# Patient Record
Sex: MALE | Race: WHITE
[De-identification: names, ages, dates, MRNs, and addresses within clinical notes are randomized per-mention and may not be internally consistent; named-entity substitution may affect disease eponyms.]

---

## 2017-07-19 ENCOUNTER — HOSPITAL ENCOUNTER (OUTPATIENT)
Dept: HOSPITAL 45 - C.CTS | Age: 22
Discharge: HOME | End: 2017-07-19
Attending: PHYSICIAN ASSISTANT
Payer: COMMERCIAL

## 2017-07-19 DIAGNOSIS — R10.31: Primary | ICD-10-CM

## 2017-07-19 NOTE — DIAGNOSTIC IMAGING REPORT
ABD/PELVIS IV AND ORAL CONT



CLINICAL HISTORY: 21 years-old Male presenting with right lower quadrant pain

for 2 days. 



TECHNIQUE: Multidetector CT of the abdomen and pelvis was performed after the

administration of oral and intravenous contrast. IV contrast: 93 mL of Omniray

320. A dose lowering technique was used consistent with the principles of ALARA

(as low as reasonably achievable). 



COMPARISON: None.



CT DOSE: The estimated cumulative dose is 304.97 mGy.cm.



FINDINGS:



 topogram: Unremarkable.



Lung bases: Lung bases clear. No pericardial or pleural effusion.



Liver: Normal morphology. No liver lesion. Patent hepatic vasculature.



Biliary: No intrahepatic or extrahepatic biliary ductal dilatation. Normal

gallbladder.



Pancreas: Normal.



Spleen: Normal.



Adrenal glands: Normal.



Kidneys and ureters: Normal. No hydronephrosis.



Gastrointestinal tract: Oral contrast has transited to the descending colon. The

appendix is mildly dilated although opacifies normally with oral contrast. No

periappendiceal inflammatory change. No bowel obstruction.



Peritoneal cavity: No free fluid or intraperitoneal gas.



Bladder: Normal.



Pelvic organs: Normal.



Vasculature: Aorta and IVC patent and normal in caliber.



Lymph nodes: No enlarged lymph nodes in the abdomen or pelvis.



Abdominal wall: Normal.



Musculoskeletal: Normal.



IMPRESSION:

1.  No acute intra-abdominal pathology. No evidence of appendicitis. No CT

explanation for the patient's symptomatology.











Electronically signed by:  Marco Antonio Proctor M.D.

7/19/2017 1:02 PM



Dictated Date/Time:  7/19/2017 12:55 PM

## 2018-05-17 ENCOUNTER — HOSPITAL ENCOUNTER (EMERGENCY)
Dept: HOSPITAL 45 - C.EDB | Age: 23
Discharge: HOME | End: 2018-05-17
Payer: COMMERCIAL

## 2018-05-17 VITALS — SYSTOLIC BLOOD PRESSURE: 116 MMHG | DIASTOLIC BLOOD PRESSURE: 54 MMHG | HEART RATE: 69 BPM | OXYGEN SATURATION: 98 %

## 2018-05-17 VITALS
WEIGHT: 179.68 LBS | WEIGHT: 179.68 LBS | HEIGHT: 72.99 IN | HEIGHT: 72.99 IN | BODY MASS INDEX: 23.81 KG/M2 | BODY MASS INDEX: 23.81 KG/M2

## 2018-05-17 VITALS — TEMPERATURE: 97.52 F

## 2018-05-17 DIAGNOSIS — Z79.1: ICD-10-CM

## 2018-05-17 DIAGNOSIS — J45.909: ICD-10-CM

## 2018-05-17 DIAGNOSIS — J30.2: Primary | ICD-10-CM

## 2018-05-17 DIAGNOSIS — K20.0: ICD-10-CM

## 2018-05-17 DIAGNOSIS — F12.90: ICD-10-CM

## 2018-05-17 NOTE — EMERGENCY ROOM VISIT NOTE
History


First contact with patient:  02:45


Chief Complaint:  THROAT PAIN/INJURY


Stated Complaint:  CONSTRICTED THROAT





History of Present Illness


The patient is a 22 year old male who presents to the Emergency Room with 

complaints of coughing episode tonight we tried as well as Allegra-D medicine 

that went down the wrong windpipe.  Patient states he tried to cough it up and 

his monitor to give him the Heimlich but this does not help.  EMS arrived and 

put the laryngoscope to his throat and the pill popped out.  Patient states he 

has problems with allergies and is on multiple medicines.  He has osteophytic 

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  He was scoped last year was diagnosed with 

this and had strictures that were dilated.  He was on omeprazole but now has 

stopped it.  Patient states she has been suffering with allergies and asthma 

since she has been in [place].  He is from [facility] and goes back 

next month.  He states he tried multiple medications.  Currently right now he 

restarted his prednisone and Singulair, Allegra and Afrin.  Patient states he 

was standing order in the pharmacy for prednisone and Singulair from [facility].  Patient states he feels like his asthma is okay.  He was taking his 

extra medicines for his allergies.  Patient also states sometimes he has 

problems swallowing food down.  He states last time he got this bad was when he 

saw the GI doctor for his EGD.  He states he had great improvement after being 

dilated and on the omeprazole.  Patient states right now he is symptom-free and 

feels much better.  Patient denies chest pain, dyspnea, fever, chills, cough,  

dysphagia.





Review of Systems


An 10 system review of systems was completed with positives and pertinent 

negatives listed in the HPI.





Past Medical/Surgical History


Eosinophilic esophagitis, seasonal allergies, asthma, migraine





Family History





Stroke





Social History


Smoking Status:  Never Smoker


Alcohol Use:  occasionally


Drug Use:  marijuana


Marital Status:  in relationship


Occupation Status:  [employer] student





Current/Historical Medications


Scheduled


Cetirizine Hcl (Zyrtec Allergy), 10 MG PO DAILY


Cetirizine-Pseudoephedrine (All Day Allergy D), 1 TAB PO BID


Montelukast Sodium (Singulair), 10 MG PO QAM


Montelukast Sodium (Singulair), 1 TAB PO DAILY


Omeprazole (Prilosec), 20 MG PO BID


Omeprazole (Prilosec), 40 MG PO DAILY





Scheduled PRN


Fluticasone Propionate (Nasal) (Flonase Allergy Relief), 2 SPRAYS YORDY DAILY PRN 

for PRN


Lorazepam (Ativan), 1 MG PO DAILY PRN for Anxiety


Sumatriptan Succinate (Imitrex), 50 MG PO PRN PRN for Migraine





Physical Exam


Vital Signs











  Date Time  Temp Pulse Resp B/P (MAP) Pulse Ox O2 Delivery O2 Flow Rate FiO2


 


18 02:41 36.4 67 18 108/61 99 Room Air  











Physical Exam


VITALS: Vitals are noted on the nurse's note and reviewed by myself.  Vital 

signs stable.


GENERAL: Pleasant male maintaining his own secretions and speaking in full 

sentences, in no acute distress, nondiaphoretic, well-developed well-nourished.


SKIN: The skin was without rashes, erythema, edema, or bruising.  There is no 

tenting of the skin.  Capillary reflex less than 2 seconds.


HEAD: Normocephalic atraumatic.  


EARS: External auditory canals clear, tympanic membranes pearly gray without 

erythema or effusion bilaterally.


EYES: Pupils equal round and reactive to light and accommodation.  Conjunctivae 

without injection, sclerae without icterus.  Extraocular movements intact.  


NOSE: Patent, turbinates without inflammation or discharge.  No sinus 

tenderness.


MOUTH: Mucous membranes moist.   Pharynx without erythema or exudate.  Uvula 

midline.  Airway patent.  Tongue does not deviate.  


NECK: Supple without nuchal rigidity.  No lymphadenopathy.  No thyromegaly.  

Cervical spine is nontender.  No JVD.


HEART: Regular rate and rhythm without murmurs gallops or rubs.


LUNGS: Clear to auscultation bilaterally without wheezes, rales or rhonchi.  No 

retractions or accessory muscle use.


ABDOMEN: Positive bowel sounds x 4.  Normal tympanic percussion.  Soft, 

nontender, without masses or organomegaly.  Barrientos sign negative.  No guarding 

or rebound tenderness. 


MUSCULOSKELETAL: No muscle atrophy, erythema, or edema noted.  


NEURO: Patient was alert and oriented to person place and time.  Normal 

sensation to light and sharp touch.     No focal neurological deficits.





Medical Decision & Procedures


ED Course


Prior records/ancillary studies reviewed.


Triage Nursing notes reviewed.


Additional history obtained from the family.





The patient's history was concerning for foreign body in the airway that was 

resolved prior to arrival by EMS.





Differential diagnosis:


Etiologies such as infections, reactive airway disease, aspiration pneumonia, 

pneumothorax, seasonal allergies, reflux, esophagitis, as well as others were 

entertained.





Physical examination:


As above. 





ER treatment provided:


Omeprazole, Singulair, Zyrtec


On reassessment the patient felt better.





Diagnostic interpretation by me:


Deferred





This appears to be consistent with seasonal allergies with his known 

eosinophilic esophagitis.  Patient's been having increasing problems swallowing 

solids.  He is having progressively worsening signs and symptoms of his 

allergies.  He was advised to follow-up with his GI doctor for repeat EGD if 

warranted and he was advised to restart his omeprazole.  He was given a 

prescription for this.  He already has had allergy testing by the allergist 

last year.  He is advised to avoid all his allergies.  Patient was advised to 

take Zyrtec, Singulair, Afrin and Sudafed as directed.  Patient was advised to 

take all medicines 1 tablet at time standing up with a full glass of water.  

Patient was advised to follow-up health services and GI this week or here in 

the ER sooner for chest pain, difficulty swallowing, worsening sinus symptoms 

or as needed.  Patient was maintaining his own secretions.  He is speaking in 

full sentences.  He was well-appearing.  All questions are answered.  By the 

evaluation outlined above emergent etiologies such as aspiration pneumonia, 

pneumothorax,  serious bacterial infections, as well as others were deemed 

relatively unlikely.  Case management was contacted to help facilitate follow-

up appointment with GI.





The pt informed about the findings as listed above. All questions were answered 

and  pleased with the treatment. Return instructions were outlined and the 

patient was discharged in stable condition.





Outpatient prescription management:


Omeprazole, Zyrtec, Singulair





Referral:


The patient was referred back to their primary care physician and GI for follow-

up in 2 to 3 days for a recheck of the current condition.





The chart was completed utilizing Dragon Speech voice recognition software.   

Grammatical errors, random word insertions, pronoun errors, and incomplete 

sentences are an occassional consequence of this system due to software 

limitations, ambient noise, and hardware issues.  Any formal questions or 

concerns about the content, text, or information contained within the body of 

this dictation should be directly addressed to the physician assistant for 

clarification.





Medical Decision


As above





Medication Reconcilliation


Current Medication List:  was personally reviewed by me





Blood Pressure Screening


Patient's blood pressure:  Normal blood pressure





Impression





 Primary Impression:  


 Seasonal allergies


 Additional Impression:  


 Eosinophilic esophagitis





Departure Information


Dispostion


Home / Self-Care





Condition


GOOD





Prescriptions





Montelukast Sodium (SINGULAIR) 10 Mg Tab


1 TAB PO DAILY for 30 Days, #30 TAB


   Prov: Abbi Forrest .BENJAMÍN         18 


Cetirizine Hcl (ZYRTEC ALLERGY) 10 Mg Cap


10 MG PO DAILY for 30 Days, #30 CAP


   Prov: Abbi Forrest .BENJAMÍN         18 


Omeprazole (PRILOSEC) 40 Mg Cap


40 MG PO DAILY for 14 Days, #14 CAP


   Prov: Abbi Forrest PA-C         18





Forms


WORK / SCHOOL INSTRUCTIONS, HOME CARE DOCUMENTATION FORM,                      

                                          IMPORTANT VISIT INFORMATION





Patient Instructions


Eosinophilic Esophagitis EoE, [facility], ED Allergy Seasonal





Additional Instructions





Take all medicines one at a time while standing up with a full glass of water.





Omeprazole 40 m tablet daily for next 2 weeks.  Take this  on an empty 

stomach.





Try Maalox or Zantac for breakthrough symptoms for reflux.





Avoid large meals.  Avoid acidic foods.





Rest and drink plenty of fluids as tolerated.





Continue current medications.





Avoid strenuous activities and anything that worsens your pain.  Resume normal 

activities once your symptoms resolve.    





Return to the ER immediately for worsening or persistent chest pain, abdominal 

pain, black or blood in your stools, vomiting, fevers, chest pains, difficulty 

breathing, worsening of your condition, or as needed.





Follow up with your primary physician/gastroenterologist in 2-3 days for a 

recheck of your current condition.  Case management will help facilitate your 

follow-up appointment.  I recommend that you obtain the records free to take 

home back to [place].





Seasonal allergies:





Zyrtec 10 m tablet daily for allergies.





Singulair 10 m tablet daily for allergies and asthma.





You can also use Afrin and Sudafed for breakthrough symptoms.  This is over-the-

counter.  Ask the pharmacist for help finding these medications.





Recommend that you avoid triggers for your allergies.





Follow up with your allergist if symptoms persist.  Call for an appointment.





Return to ER sooner for chest pain, difficulty breathing, throat tightness, 

worsening signs or symptoms or as needed.





Problem Qualifiers